# Patient Record
Sex: OTHER/UNKNOWN | URBAN - METROPOLITAN AREA
[De-identification: names, ages, dates, MRNs, and addresses within clinical notes are randomized per-mention and may not be internally consistent; named-entity substitution may affect disease eponyms.]

---

## 2023-12-21 ENCOUNTER — HOSPITAL ENCOUNTER (EMERGENCY)
Facility: HOSPITAL | Age: 53
Discharge: HOME | End: 2023-12-22
Attending: EMERGENCY MEDICINE

## 2023-12-21 DIAGNOSIS — R41.82 ALTERED MENTAL STATUS, UNSPECIFIED ALTERED MENTAL STATUS TYPE: ICD-10-CM

## 2023-12-21 DIAGNOSIS — R00.0 TACHYCARDIA: Primary | ICD-10-CM

## 2023-12-21 DIAGNOSIS — F10.921 ALCOHOL INTOXICATION WITH DELIRIUM: ICD-10-CM

## 2023-12-21 LAB
ALLENS TEST: ABNORMAL
AMMONIA PLAS-SCNC: 36 UMOL/L (ref 10–50)
AMPHET+METHAMPHET UR QL: NEGATIVE
APAP SERPL-MCNC: <3 UG/ML (ref 10–20)
BARBITURATES UR QL SCN>200 NG/ML: NEGATIVE
BASOPHILS # BLD AUTO: 0.06 K/UL (ref 0–0.2)
BASOPHILS NFR BLD: 0.8 % (ref 0–1.9)
BENZODIAZ UR QL SCN>200 NG/ML: NEGATIVE
BILIRUB UR QL STRIP: NEGATIVE
BZE UR QL SCN: NEGATIVE
CANNABINOIDS UR QL SCN: NEGATIVE
CLARITY UR REFRACT.AUTO: CLEAR
COLOR UR AUTO: COLORLESS
CREAT UR-MCNC: 22 MG/DL (ref 23–375)
DIFFERENTIAL METHOD BLD: ABNORMAL
EOSINOPHIL # BLD AUTO: 0.1 K/UL (ref 0–0.5)
EOSINOPHIL NFR BLD: 1.6 % (ref 0–8)
ERYTHROCYTE [DISTWIDTH] IN BLOOD BY AUTOMATED COUNT: 12.6 % (ref 11.5–14.5)
ETHANOL SERPL-MCNC: 429 MG/DL
GLUCOSE UR QL STRIP: NEGATIVE
HCO3 UR-SCNC: 25.7 MMOL/L (ref 24–28)
HCT VFR BLD AUTO: 47.4 % (ref 40–54)
HCV AB SERPL QL IA: NORMAL
HGB BLD-MCNC: 15.8 G/DL (ref 14–18)
HGB UR QL STRIP: ABNORMAL
HIV 1+2 AB+HIV1 P24 AG SERPL QL IA: NORMAL
IMM GRANULOCYTES # BLD AUTO: 0.05 K/UL (ref 0–0.04)
IMM GRANULOCYTES NFR BLD AUTO: 0.7 % (ref 0–0.5)
KETONES UR QL STRIP: NEGATIVE
LACTATE SERPL-SCNC: 4.4 MMOL/L (ref 0.5–2.2)
LEUKOCYTE ESTERASE UR QL STRIP: NEGATIVE
LYMPHOCYTES # BLD AUTO: 4.3 K/UL (ref 1–4.8)
LYMPHOCYTES NFR BLD: 57.8 % (ref 18–48)
MAGNESIUM SERPL-MCNC: 2.2 MG/DL (ref 1.6–2.6)
MCH RBC QN AUTO: 29.5 PG (ref 27–31)
MCHC RBC AUTO-ENTMCNC: 33.3 G/DL (ref 32–36)
MCV RBC AUTO: 88 FL (ref 82–98)
METHADONE UR QL SCN>300 NG/ML: NEGATIVE
MONOCYTES # BLD AUTO: 0.6 K/UL (ref 0.3–1)
MONOCYTES NFR BLD: 7.7 % (ref 4–15)
NEUTROPHILS # BLD AUTO: 2.3 K/UL (ref 1.8–7.7)
NEUTROPHILS NFR BLD: 31.4 % (ref 38–73)
NITRITE UR QL STRIP: NEGATIVE
NRBC BLD-RTO: 0 /100 WBC
OPIATES UR QL SCN: NEGATIVE
PCO2 BLDA: 43 MMHG (ref 35–45)
PCP UR QL SCN>25 NG/ML: NEGATIVE
PH SMN: 7.38 [PH] (ref 7.35–7.45)
PH UR STRIP: 5 [PH] (ref 5–8)
PLATELET # BLD AUTO: 220 K/UL (ref 150–450)
PMV BLD AUTO: 9.1 FL (ref 9.2–12.9)
PO2 BLDA: 38 MMHG (ref 40–60)
POC BE: 1 MMOL/L
POC SATURATED O2: 71 % (ref 95–100)
POC TCO2: 27 MMOL/L (ref 24–29)
PROT UR QL STRIP: NEGATIVE
RBC # BLD AUTO: 5.36 M/UL (ref 4.6–6.2)
SALICYLATES SERPL-MCNC: <5 MG/DL (ref 15–30)
SAMPLE: ABNORMAL
SITE: ABNORMAL
SP GR UR STRIP: 1.01 (ref 1–1.03)
TOXICOLOGY INFORMATION: ABNORMAL
TROPONIN I SERPL DL<=0.01 NG/ML-MCNC: <0.006 NG/ML (ref 0–0.03)
TSH SERPL DL<=0.005 MIU/L-ACNC: 2.58 UIU/ML (ref 0.4–4)
URN SPEC COLLECT METH UR: ABNORMAL
WBC # BLD AUTO: 7.41 K/UL (ref 3.9–12.7)

## 2023-12-21 PROCEDURE — 80179 DRUG ASSAY SALICYLATE: CPT | Performed by: PHYSICIAN ASSISTANT

## 2023-12-21 PROCEDURE — 96367 TX/PROPH/DG ADDL SEQ IV INF: CPT

## 2023-12-21 PROCEDURE — 80143 DRUG ASSAY ACETAMINOPHEN: CPT | Performed by: PHYSICIAN ASSISTANT

## 2023-12-21 PROCEDURE — 81003 URINALYSIS AUTO W/O SCOPE: CPT | Mod: 59 | Performed by: EMERGENCY MEDICINE

## 2023-12-21 PROCEDURE — 84484 ASSAY OF TROPONIN QUANT: CPT | Performed by: EMERGENCY MEDICINE

## 2023-12-21 PROCEDURE — 80053 COMPREHEN METABOLIC PANEL: CPT | Performed by: EMERGENCY MEDICINE

## 2023-12-21 PROCEDURE — 25000003 PHARM REV CODE 250: Performed by: EMERGENCY MEDICINE

## 2023-12-21 PROCEDURE — 93005 ELECTROCARDIOGRAM TRACING: CPT

## 2023-12-21 PROCEDURE — 93010 ELECTROCARDIOGRAM REPORT: CPT | Mod: 76,,, | Performed by: INTERNAL MEDICINE

## 2023-12-21 PROCEDURE — 80307 DRUG TEST PRSMV CHEM ANLYZR: CPT | Performed by: EMERGENCY MEDICINE

## 2023-12-21 PROCEDURE — 82077 ASSAY SPEC XCP UR&BREATH IA: CPT | Performed by: EMERGENCY MEDICINE

## 2023-12-21 PROCEDURE — 84443 ASSAY THYROID STIM HORMONE: CPT | Performed by: EMERGENCY MEDICINE

## 2023-12-21 PROCEDURE — 87389 HIV-1 AG W/HIV-1&-2 AB AG IA: CPT | Performed by: PHYSICIAN ASSISTANT

## 2023-12-21 PROCEDURE — 96365 THER/PROPH/DIAG IV INF INIT: CPT

## 2023-12-21 PROCEDURE — 96375 TX/PRO/DX INJ NEW DRUG ADDON: CPT

## 2023-12-21 PROCEDURE — 86803 HEPATITIS C AB TEST: CPT | Performed by: PHYSICIAN ASSISTANT

## 2023-12-21 PROCEDURE — 99291 CRITICAL CARE FIRST HOUR: CPT

## 2023-12-21 PROCEDURE — 82803 BLOOD GASES ANY COMBINATION: CPT

## 2023-12-21 PROCEDURE — 83605 ASSAY OF LACTIC ACID: CPT | Mod: 91 | Performed by: EMERGENCY MEDICINE

## 2023-12-21 PROCEDURE — 85025 COMPLETE CBC W/AUTO DIFF WBC: CPT | Performed by: EMERGENCY MEDICINE

## 2023-12-21 PROCEDURE — 63600175 PHARM REV CODE 636 W HCPCS: Performed by: EMERGENCY MEDICINE

## 2023-12-21 PROCEDURE — 96361 HYDRATE IV INFUSION ADD-ON: CPT

## 2023-12-21 PROCEDURE — 99900035 HC TECH TIME PER 15 MIN (STAT)

## 2023-12-21 PROCEDURE — 83735 ASSAY OF MAGNESIUM: CPT | Performed by: EMERGENCY MEDICINE

## 2023-12-21 PROCEDURE — 93010 ELECTROCARDIOGRAM REPORT: CPT | Mod: ,,, | Performed by: INTERNAL MEDICINE

## 2023-12-21 PROCEDURE — 82140 ASSAY OF AMMONIA: CPT | Performed by: EMERGENCY MEDICINE

## 2023-12-21 PROCEDURE — 87040 BLOOD CULTURE FOR BACTERIA: CPT | Performed by: EMERGENCY MEDICINE

## 2023-12-21 RX ORDER — LORAZEPAM 2 MG/ML
1 INJECTION INTRAMUSCULAR
Status: COMPLETED | OUTPATIENT
Start: 2023-12-21 | End: 2023-12-21

## 2023-12-21 RX ADMIN — LORAZEPAM 1 MG: 2 INJECTION INTRAMUSCULAR; INTRAVENOUS at 08:12

## 2023-12-21 RX ADMIN — SODIUM CHLORIDE 1000 ML: 9 INJECTION, SOLUTION INTRAVENOUS at 08:12

## 2023-12-21 RX ADMIN — FOLIC ACID 1 MG: 5 INJECTION, SOLUTION INTRAMUSCULAR; INTRAVENOUS; SUBCUTANEOUS at 10:12

## 2023-12-21 RX ADMIN — THIAMINE HYDROCHLORIDE 100 MG: 100 INJECTION, SOLUTION INTRAMUSCULAR; INTRAVENOUS at 09:12

## 2023-12-22 VITALS
HEART RATE: 100 BPM | TEMPERATURE: 99 F | OXYGEN SATURATION: 96 % | RESPIRATION RATE: 14 BRPM | DIASTOLIC BLOOD PRESSURE: 73 MMHG | SYSTOLIC BLOOD PRESSURE: 148 MMHG

## 2023-12-22 LAB
ALBUMIN SERPL BCP-MCNC: 4.5 G/DL (ref 3.5–5.2)
ALP SERPL-CCNC: 124 U/L (ref 55–135)
ALT SERPL W/O P-5'-P-CCNC: 31 U/L (ref 10–44)
ANION GAP SERPL CALC-SCNC: 19 MMOL/L (ref 8–16)
AST SERPL-CCNC: 28 U/L (ref 10–40)
BILIRUB SERPL-MCNC: 0.5 MG/DL (ref 0.1–1)
BUN SERPL-MCNC: 7 MG/DL (ref 6–20)
CALCIUM SERPL-MCNC: 8.9 MG/DL (ref 8.7–10.5)
CHLORIDE SERPL-SCNC: 106 MMOL/L (ref 95–110)
CO2 SERPL-SCNC: 22 MMOL/L (ref 23–29)
CREAT SERPL-MCNC: 0.8 MG/DL (ref 0.5–1.4)
EST. GFR  (NO RACE VARIABLE): >60 ML/MIN/1.73 M^2
ETHANOL SERPL-MCNC: 136 MG/DL
GLUCOSE SERPL-MCNC: 165 MG/DL (ref 70–110)
POCT GLUCOSE: 126 MG/DL (ref 70–110)
POTASSIUM SERPL-SCNC: 4.1 MMOL/L (ref 3.5–5.1)
PROT SERPL-MCNC: 8.7 G/DL (ref 6–8.4)
SODIUM SERPL-SCNC: 147 MMOL/L (ref 136–145)

## 2023-12-22 PROCEDURE — 82077 ASSAY SPEC XCP UR&BREATH IA: CPT | Performed by: STUDENT IN AN ORGANIZED HEALTH CARE EDUCATION/TRAINING PROGRAM

## 2023-12-22 PROCEDURE — 82962 GLUCOSE BLOOD TEST: CPT

## 2023-12-22 NOTE — ED NOTES
Pt belongings removed and placed into closet and safe with pt label on bags    Pt belongings in closet include:  1 shirt  1 pair of jeans  1 pair of shoes  1 pair of socks  1 belt    Pt belongings in safe include:  $10  1 wallet    1 debora card  1 debit card  1 ID  1 cellphone

## 2023-12-22 NOTE — PROVIDER PROGRESS NOTES - EMERGENCY DEPT.
Encounter Date: 12/21/2023    ED Physician Progress Notes        Physician Note:   I received this patient in sign out from the previous team.  I have discussed the patient's history and presentation in the ED with Dr. Waite  The patient is a 89 y.o. adult who presented to the ED with Alcohol Intoxication (Sleeping [place], +ETOH, urinated on self. British speaking only )    Significant history and PE findings:  Old British-speaking male presenting with presumed to alcohol intoxication after being found by bystanders brought in by EMS.  Currently on a one-to-one and pec for inability care for self and grave disability secondary to significant intoxication and risk of leaving or harming himself or others in doing so.  Significant diagnostic results:  Alcohol level over 400, lactate greater than 4  Pending studies and/or consultations:  Remainder of labs, CT head and chest x-ray  Disposition:  Will continue to monitor, update patient on results of testing and determine appropriate additional treatment for the duration of stay in ED.  Pertinent lab and imaging findings are below.  Leon Jacques,  9:07 PM 12/21/2023    He has been tachycardic and hypertensive.  He has been given IV fluids, thiamine, folate, benzodiazepines.    Awaiting CT head and remainder of labs.  Will need prolonged period of observation for return to sobriety, perhaps admission if not cleared within the next several hours.    Will need to be interviewed using  given it appears the patient speaks only British.  However, currently not making any sense with the .      We are able to find ID on the patient.  52-year-old gentleman.  No prior visits according to EMR.    10:34 PM  Patient still resting comfortably at this time, HR 100s, SPO2 99%    11:22 PM  CTH done  I reviewed - no obvious ICH, awaiting final read  Labs still in process    12:30 AM  CTH some artifact, no ICH  HR 100s, stable VS  otherwise  Lactic acid 2.7, asa/apap undetectable  Will need repeat etoh level and reassessment    2:52 AM  More awake and alert.  He speaks a decent amount of english. States he drank alcohol earlier in the evening.  He has no current physical complaints.  He lives with his 2 sons who seem to be in her late teens.  He would like to call 1 of his sons or another friend or family member to come retrieved him.  If he can have someone pick him up and take him home safely then I would be happy to let him go earlier.  He otherwise is appropriate and oriented at this time.    4:01 AM  No one can  this patient at this time.  He is not sober enough to ambulate or to be discharged at this time.    5:23 AM  Patient is still not quite ready to have ambulatory trial.  May p.o. trial.    I tried to call his son Narayan at [phone] but there was no answer.  Will need to be signed out.  Currently on 1:1 and PECed but will very likely be able to be cleared / rescinded if patient chad.

## 2023-12-22 NOTE — ED NOTES
"Put condom cath on pt. Returned to the room and pt had removed condom cath and urinated on the floor. Pt and floor cleaned. Explained to pt the use of the condom cath and attempted to put a new one on. Pt would not let me touch him and stated " no,no, no". Urinal placed at bedside and instructed pt on how to use it.   "

## 2023-12-22 NOTE — PROVIDER PROGRESS NOTES - EMERGENCY DEPT.
Encounter Date: 12/21/2023    ED Physician Progress Notes            ED Physician Hand-off Note:    ED Course: I assumed care of patient from off-going ED physician, Dr. Jacques.  Briefly, Patient is a 52-year-old English-speaking patient presenting via EMS for acute alcohol intoxication he was found sleeping [place] a [employer] [place].  It was presumed that he would alcohol intoxication after free and found down by bystanders and brought in via EMS initial alcohol level greater than 450 with a lactate greater than 4 CT head, chest x-ray negative labs show improvement with lactate improved to 2.4.  Was obtained initially secondary to altered mental status and acute call intoxication. Patient signed out to me with re-evaluation and alcohol sobriety evaluation.  Potential for rescinding the pec based on sobriety.    At the time of signout plan was pending metabolism of alcohol, ambulation, re-evaluation.    Re-evaluation:  Patient clinically sober with a blood alcohol less than 130, patient able to ambulate and tolerate p.o. without difficulty.  Had a long discussion with the patient with an  and we discussed his binge drinking secondary to a birthday celebration.  No previous history of alcohol seizures, I reviewed all of his labs, imaging without any acute findings.  Patient otherwise asymptomatic at this time.  Patient denies any suicide ideation, homicidal ideation, hallucinations, delirium or any acute psychosis. PEC is rescinded.  He is requesting discharge, his family member will pick him up.    Disposition:  Discharge    Patient comfortable with discharge. Patient counseled regarding exam, results, diagnosis, treatment, and plan.    Impression:     Final diagnoses:  [R00.0] Tachycardia (Primary)  [R41.82] Altered mental status, unspecified altered mental status type  [F10.921] Alcohol intoxication with delirium      Branden Salinas DO, FAAEM  Emergency Staff Physician   Dept of Emergency Medicine    [facility]  Spectralink: [id number]        Disclaimer: This note has been generated using voice-recognition software. There may be typographical errors that have been missed during proof-reading.  b

## 2023-12-22 NOTE — ED PROVIDER NOTES
"Encounter Date: 12/21/2023       History     Chief Complaint   Patient presents with    Alcohol Intoxication     Sleeping [place], +ETOH, urinated on self. [place]ib[place]ian speaking only      Patient is a Wade Wiley found sleeping behind a gas station and was brought in by EMS with reports of etoh use and patient had urinated on himself.  Pt currently is laughing and repeating "No es importa" to all questions.   He replied "Parminder" when asked about his name but only said the above when asked about his last name or date of birth.    The history is provided by the EMS personnel and the patient. The history is limited by the condition of the patient and a language barrier. A  was used.     Review of patient's allergies indicates:  No Known Allergies  No past medical history on file.  No past surgical history on file.  No family history on file.         Initial Vitals [12/21/23 1925]   BP Pulse Resp Temp SpO2   (!) 181/117 (!) 146 20 97.7 °F (36.5 °C) 99 %      MAP       --         Physical Exam    Constitutional: Kari Zazueta appears well-developed and well-nourished. Kari Zazueta is not diaphoretic. No distress.   Pt appears to be moving all extremities equally  Face is symmetric   HENT:   Head: Normocephalic and atraumatic.   Eyes: Conjunctivae and EOM are normal. Pupils are equal, round, and reactive to light.   Neck: Neck supple.   Normal range of motion.  Cardiovascular:  Regular rhythm and intact distal pulses.           tachycardia   Pulmonary/Chest: No respiratory distress.   Abdominal: Kari Zazueta exhibits no distension. There is no abdominal tenderness.   Musculoskeletal:      Cervical back: Normal range of motion and neck supple.     Neurological: Kari Zazueta is alert. GCS score is 15. GCS eye subscore is 4. GCS verbal subscore is 5. GCS motor subscore is 6.   Psychiatric:   Appears intoxicated or under the influence         ED Course "   Procedures  Labs Reviewed   DRUG SCREEN PANEL, URINE EMERGENCY - Abnormal; Notable for the following components:       Result Value    Creatinine, Urine 22.0 (*)     All other components within normal limits    Narrative:     Specimen Source->Urine   COMPREHENSIVE METABOLIC PANEL - Abnormal; Notable for the following components:    Sodium 147 (*)     CO2 22 (*)     Glucose 165 (*)     Total Protein 8.7 (*)     Anion Gap 19 (*)     All other components within normal limits    Narrative:     Release to patient->Immediate      PER JANETH BARBOZA,  REQUEST ADD ON SALICYLATE (ORDER [id number])   ACETAMINOPHEN LEVEL (ORDER [id number])  12/21/2023  21:24    CBC W/ AUTO DIFFERENTIAL - Abnormal; Notable for the following components:    MPV 9.1 (*)     Immature Granulocytes 0.7 (*)     Immature Grans (Abs) 0.05 (*)     Gran % 31.4 (*)     Lymph % 57.8 (*)     All other components within normal limits    Narrative:     Release to patient->Immediate   LACTIC ACID, PLASMA - Abnormal; Notable for the following components:    Lactate (Lactic Acid) 4.4 (*)     All other components within normal limits    Narrative:     Release to patient->Immediate   URINALYSIS, REFLEX TO URINE CULTURE - Abnormal; Notable for the following components:    Color, UA Colorless (*)     Occult Blood UA Trace (*)     All other components within normal limits    Narrative:     Specimen Source->Urine   ALCOHOL,MEDICAL (ETHANOL) - Abnormal; Notable for the following components:    Alcohol, Serum 429 (*)     All other components within normal limits    Narrative:     Release to patient->Immediate   ACETAMINOPHEN LEVEL - Abnormal; Notable for the following components:    Acetaminophen (Tylenol), Serum <3.0 (*)     All other components within normal limits    Narrative:     Release to patient->Immediate      PER JANETH BARBOZA,  REQUEST ADD ON SALICYLATE (ORDER [id number])   ACETAMINOPHEN LEVEL (ORDER [id number])  12/21/2023  21:24    SALICYLATE LEVEL -  Abnormal; Notable for the following components:    Salicylate Lvl <5.0 (*)     All other components within normal limits    Narrative:     Release to patient->Immediate      PER JANETH BARBOZA, DO REQUEST ADD ON SALICYLATE (ORDER [id number])   ACETAMINOPHEN LEVEL (ORDER [id number])  12/21/2023  21:24    ALCOHOL,MEDICAL (ETHANOL) - Abnormal; Notable for the following components:    Alcohol, Serum 136 (*)     All other components within normal limits   ISTAT PROCEDURE - Abnormal; Notable for the following components:    POC PO2 38 (*)     All other components within normal limits   POCT GLUCOSE - Abnormal; Notable for the following components:    POCT Glucose 126 (*)     All other components within normal limits   CULTURE, BLOOD   HIV 1 / 2 ANTIBODY    Narrative:     Release to patient->Immediate   HEPATITIS C ANTIBODY    Narrative:     Release to patient->Immediate   MAGNESIUM    Narrative:     Release to patient->Immediate   TSH    Narrative:     Release to patient->Immediate   TROPONIN I    Narrative:     Release to patient->Immediate   AMMONIA    Narrative:     Release to patient->Immediate      PER JANETH BARBOZA, DO REQUEST ADD ON SALICYLATE (ORDER [id number])   ACETAMINOPHEN LEVEL (ORDER [id number])  12/21/2023  21:24    BETA - HYDROXYBUTYRATE, SERUM   SALICYLATE LEVEL   ACETAMINOPHEN LEVEL     EKG Readings: (Independently Interpreted)   EKG done at 7:41 p.m. sinus tachycardia rate of 132 left axis deviation  No prior to compare to as patient is currently an unknown patient     ECG Results              EKG 12-lead (Final result)  Result time 12/22/23 15:52:14      Final result by Interface, Lab In [facility] (12/22/23 15:52:14)                   Narrative:    Test Reason : R00.0,    Vent. Rate : 170 BPM     Atrial Rate : 060 BPM     P-R Int : 000 ms          QRS Dur : 088 ms      QT Int : 306 ms       P-R-T Axes : 000 -81 046 degrees     QTc Int : 514 ms    Supraventricular tachycardia  Left axis deviation  Possible  Anterior infarct ,age undetermined  Abnormal ECG  When compared with ECG of 21-DEC-2023 19:41,  No significant change was found  Confirmed by Aminta Dupont MD ([id number]) on 12/22/2023 3:52:07 PM    Referred By: VIRGINIA   SELF           Confirmed By:Aminta Dupont MD                                     EKG 12-lead (Final result)  Result time 12/22/23 15:52:46      Final result by Interface, Lab In [facility] (12/22/23 15:52:46)                   Narrative:    Test Reason : R00.0,    Vent. Rate : 132 BPM     Atrial Rate : 132 BPM     P-R Int : 138 ms          QRS Dur : 096 ms      QT Int : 306 ms       P-R-T Axes : 068 -36 060 degrees     QTc Int : 453 ms    Sinus tachycardia  Left axis deviation  Inferior infarct ,age undetermined  Abnormal ECG  No previous ECGs available  Confirmed by Aminta Dupont MD ([id number]) on 12/22/2023 3:52:34 PM    Referred By: VIRGINIA   SELF           Confirmed By:Aminta Dupont MD                                  Imaging Results              CT Head Without Contrast (Final result)  Result time 12/22/23 00:23:11      Final result by Naveed Alcaraz MD (12/22/23 00:23:11)                   Impression:      Allowing for artifacts, at this time CT demonstrates no depressed calvarial fracture or acute intracranial abnormality.    Nasal bone deformities are likely chronic.  Correlate clinically      Electronically signed by: Naveed Alcaraz  Date:    12/22/2023  Time:    00:23               Narrative:    EXAMINATION:  CT HEAD WITHOUT CONTRAST    CLINICAL HISTORY:  Mental status change, unknown cause;    TECHNIQUE:  Low dose axial images were obtained through the head.  Coronal and sagittal reformations were also performed. Contrast was not administered.    COMPARISON:  None.    FINDINGS:  Artifacts related to motion and/or beam hardening degrade portions of the scan.    Thickened appearance of the posterosuperior aspect of the falx cerebri on the axial images is likely related to the normal  superior sagittal sinus.  Allowing for this, no acute intracranial hemorrhage is demonstrated.    No depressed calvarial fracture, discrete intracranial mass or mass effect, midline shift, discrete acute large vascular territory brain infarct, hydrocephalus, pneumocephalus, pathologic extra-axial fluid collection, or other acute intracranial abnormality is demonstrated by CT.    There is some cerebral parenchymal volume loss, with a proportionate degree of presumed ex vacuo prominence of the sulci, cisterns, and ventricles.    Polypoid opacity in the right maxillary sinus, likely mucous retention cyst.  Some ethmoid air cells are also opacified, mainly on the left.    Pneumatized portions of the mastoid air cells remain well aerated bilaterally.  No CT evidence of acute hemotympanum.    Dental appliance in the oral cavity.    Nasal bone deformities likely chronic; visualized portions of the nose demonstrate no appreciable overlying nasal soft tissue swelling on these CT images.                                       X-Ray Chest AP Portable (Final result)  Result time 12/21/23 20:21:24      Final result by Juan C Carbajal MD (12/21/23 20:21:24)                   Impression:      1. Interstitial attenuation is accentuated by shallow inspiratory effort, no large focal consolidation.      Electronically signed by: Juan C Carbajal MD  Date:    12/21/2023  Time:    20:21               Narrative:    EXAMINATION:  XR CHEST AP PORTABLE    CLINICAL HISTORY:  AMS;    TECHNIQUE:  Single frontal view of the chest was performed.    COMPARISON:  None    FINDINGS:  The cardiomediastinal silhouette is not enlarged, magnified by technique..  There is no pleural effusion.  The trachea is midline.  The lungs are symmetrically expanded bilaterally with coarse interstitial attenuation, accentuated by shallow inspiratory effort..  No large focal consolidation seen.  There is no pneumothorax.  The osseous structures are unremarkable.                                        Medications   sodium chloride 0.9% bolus 1,000 mL 1,000 mL (0 mLs Intravenous Stopped 12/21/23 2204)   thiamine (B-1) 100 mg in dextrose 5 % (D5W) 100 mL IVPB (0 mg Intravenous Stopped 12/21/23 2212)   folic acid 1 mg in dextrose 5 % (D5W) 100 mL IVPB (0 mg Intravenous Stopped 12/21/23 2348)   LORazepam injection 1 mg (1 mg Intravenous Given 12/21/23 2057)     Medical Decision Making  DDX: intoxication, withdrawal, substance use, encephalopathy, ICH    Pt is very tachycardic, will treat with iv fluids  Work up broadened to include sepsis, AMS as patient is unreliable  Will also scan his head for possible ICH or trauma    8:55 PM  Patient placed under a pec as he was attempting to get out of bed, and he is a danger to himself due to his extreme intoxication  ETOH level came back at 429  Ativan given prior to CT head but necessary to ensure patient does not have ICH    Care transferred to Dr. Jacques pending labs, CT, sobriety    Amount and/or Complexity of Data Reviewed  Labs: ordered.  Radiology: ordered.    Risk  Prescription drug management.              Attending Attestation:         Attending Critical Care:   Critical Care Times:   ==============================================================  Total Critical Care Time - exclusive of procedural time: 35 minutes.  ==============================================================  Critical care was time spent personally by me on the following activities: obtaining history from patient or relative, examination of patient, review of old charts, ordering lab, x-rays, and/or EKG, development of treatment plan with patient or relative, ordering and performing treatments and interventions and re-evaluation of patient's conition.   Critical Care Condition: potentially life-threatening   Critical Care Comments: AMS, etoh intoxication       Attending ED Notes:   Critical Care  Date: 12/21/2023  Performed by: Ella Waite,  MD    Authorized by: Ella Waite MD   Total critical care time (exclusive of procedural time) : 35 minutes  Critical care was necessary to treat or prevent imminent or life-threatening deterioration of the following conditions:  AMS, intoxication          ED Course as of 12/27/23 1046   Thu Dec 21, 2023   2033 Pt is still awake, alert, laughing. He pulled his condom cath off and urinated on the floor [GK]   2057 HR back in the 160's will get repeat EKG and give ativan IV [GK]   2112 Pt went into SVT with a HR of 160 but went back into sinus tachycardia HR of 122 before intervention [GK]      ED Course User Index  [GK] Ella Waite MD                           Clinical Impression:  Final diagnoses:  [R00.0] Tachycardia (Primary)  [R41.82] Altered mental status, unspecified altered mental status type  [F10.921] Alcohol intoxication with delirium          ED Disposition Condition    Discharge Stable          ED Prescriptions    None       Follow-up Information       Follow up With Specialties Details Why Contact [facility]  Schedule an appointment as soon as possible for a visit in 1 week As needed, If symptoms worsen [address] [facility] [address]  [phone]               Ella Waite MD  12/27/23 1046

## 2023-12-22 NOTE — ED NOTES
Belongings  White shirt  Jemartin (one pair)    Murphy tennis  Black socks    Valuables  $10.00  Cellphone  Wallet  ID  Genie card  Debit card

## 2023-12-22 NOTE — ED NOTES
Physician at bedside speaking with pt with use of bedside . MD is rescinding the current PEC on pt. Pt will be discharged after insuring the pt can eat, drink and ambulate without difficulty.

## 2023-12-26 LAB — BACTERIA BLD CULT: NORMAL
